# Patient Record
Sex: MALE | Race: WHITE | NOT HISPANIC OR LATINO | URBAN - METROPOLITAN AREA
[De-identification: names, ages, dates, MRNs, and addresses within clinical notes are randomized per-mention and may not be internally consistent; named-entity substitution may affect disease eponyms.]

---

## 2017-10-17 ENCOUNTER — EMERGENCY (EMERGENCY)
Facility: HOSPITAL | Age: 72
LOS: 1 days | Discharge: HOME | End: 2017-10-17
Attending: EMERGENCY MEDICINE | Admitting: EMERGENCY MEDICINE
Payer: MEDICARE

## 2017-10-17 VITALS
TEMPERATURE: 98 F | RESPIRATION RATE: 20 BRPM | HEART RATE: 70 BPM | HEIGHT: 68 IN | SYSTOLIC BLOOD PRESSURE: 148 MMHG | OXYGEN SATURATION: 98 % | DIASTOLIC BLOOD PRESSURE: 68 MMHG | WEIGHT: 220.02 LBS

## 2017-10-17 LAB
APPEARANCE UR: CLEAR — SIGNIFICANT CHANGE UP
BILIRUB UR-MCNC: NEGATIVE — SIGNIFICANT CHANGE UP
COLOR SPEC: YELLOW — SIGNIFICANT CHANGE UP
DIFF PNL FLD: NEGATIVE — SIGNIFICANT CHANGE UP
EPI CELLS # UR: SIGNIFICANT CHANGE UP
GLUCOSE UR QL: NEGATIVE MG/DL — SIGNIFICANT CHANGE UP
KETONES UR-MCNC: NEGATIVE — SIGNIFICANT CHANGE UP
LEUKOCYTE ESTERASE UR-ACNC: NEGATIVE — SIGNIFICANT CHANGE UP
NITRITE UR-MCNC: NEGATIVE — SIGNIFICANT CHANGE UP
PH UR: 6 — SIGNIFICANT CHANGE UP (ref 5–8)
PROT UR-MCNC: NEGATIVE MG/DL — SIGNIFICANT CHANGE UP
SP GR SPEC: 1 — LOW (ref 1.01–1.02)
UROBILINOGEN FLD QL: NEGATIVE MG/DL — SIGNIFICANT CHANGE UP

## 2017-10-17 PROCEDURE — 99283 EMERGENCY DEPT VISIT LOW MDM: CPT

## 2017-10-17 PROCEDURE — 81001 URINALYSIS AUTO W/SCOPE: CPT

## 2017-10-17 RX ORDER — AZITHROMYCIN 500 MG/1
1 TABLET, FILM COATED ORAL
Qty: 1 | Refills: 0 | OUTPATIENT
Start: 2017-10-17 | End: 2017-10-22

## 2017-10-17 RX ORDER — DIPHENHYDRAMINE HCL 50 MG
50 CAPSULE ORAL ONCE
Qty: 0 | Refills: 0 | Status: COMPLETED | OUTPATIENT
Start: 2017-10-17 | End: 2017-10-17

## 2017-10-17 RX ORDER — FAMOTIDINE 10 MG/ML
20 INJECTION INTRAVENOUS
Qty: 0 | Refills: 0 | Status: DISCONTINUED | OUTPATIENT
Start: 2017-10-17 | End: 2017-10-21

## 2017-10-17 RX ORDER — FAMOTIDINE 10 MG/ML
2 INJECTION INTRAVENOUS
Qty: 40 | Refills: 0 | OUTPATIENT
Start: 2017-10-17 | End: 2017-10-27

## 2017-10-17 RX ADMIN — Medication 50 MILLIGRAM(S): at 10:45

## 2017-10-17 RX ADMIN — Medication 40 MILLIGRAM(S): at 10:45

## 2017-10-17 RX ADMIN — FAMOTIDINE 20 MILLIGRAM(S): 10 INJECTION INTRAVENOUS at 10:45

## 2017-10-17 NOTE — ED STATDOCS - PROGRESS NOTE DETAILS
c/o bl eyelid redness/swelling x yesterday. slight L eye blurriness. 2 days ago cutting down trees thinks possible poison ivy. This morning both his eyes were swollen shut with some yellow d/c.   PE lungs cta heart rrr eyelids bl with marked swelling some yellow d/c. conjunctiva no swelling/injection. unable to escribe c/o bl eyelid redness/swelling x yesterday. slight L eye blurriness. 2 days ago cutting down trees thinks possible poison ivy. This morning both his eyes were swollen shut with some yellow d/c.   PE lungs cta mouth no swelling heart rrr eyelids bl with marked swelling some yellow d/c. conjunctiva no swelling/injection. swelling decreased after meds, pt asking to go home. ua neg. fu pmd. prednisone, pepcid, benadryl, zithromax.

## 2017-10-17 NOTE — ED STATDOCS - OBJECTIVE STATEMENT
73 y/o M w/ PMHx of HLD and HTN presents to ED c/o b/l eye redness and swelling x1 day. Associated L eye blurry vision and te. Pt states he was in his backyard 2 days ago and was cutting down trees therefore believes It may be poison ivy. He states he began as redness and when he woke up this morning both his eyes were swollen. Denies pedal edema, SOB, new detergent/cologne, hoarseness, difficulty swallowing, fever, CP, cough, diplopia, rash or any other complaints at this time.

## 2017-10-17 NOTE — ED STATDOCS - ATTENDING CONTRIBUTION TO CARE
I, Reji Negro, performed the initial face to face bedside interview with this patient regarding history of present illness, review of symptoms and relevant past medical, social and family history.  I completed an independent physical examination.  I was the initial provider who evaluated this patient. I have signed out the follow up of any pending tests (i.e. labs, radiological studies) to the ACP.  I have communicated the patient’s plan of care and disposition with the ACP.

## 2017-10-17 NOTE — ED STATDOCS - MEDICAL DECISION MAKING DETAILS
Periorbital edema. Suspect allergic type rxn, will treat accordingly. No known allergen. Urine for proteinuria, CMP for kidney function and liver function. Check and re-assess.

## 2023-10-25 PROBLEM — I10 ESSENTIAL (PRIMARY) HYPERTENSION: Chronic | Status: ACTIVE | Noted: 2017-10-17

## 2023-10-25 PROBLEM — E78.5 HYPERLIPIDEMIA, UNSPECIFIED: Chronic | Status: ACTIVE | Noted: 2017-10-17

## 2023-11-27 PROBLEM — Z00.00 ENCOUNTER FOR PREVENTIVE HEALTH EXAMINATION: Status: ACTIVE | Noted: 2023-11-27

## 2023-12-01 ENCOUNTER — APPOINTMENT (OUTPATIENT)
Dept: NEPHROLOGY | Facility: CLINIC | Age: 78
End: 2023-12-01